# Patient Record
Sex: FEMALE | Race: WHITE | NOT HISPANIC OR LATINO | Employment: PART TIME | URBAN - METROPOLITAN AREA
[De-identification: names, ages, dates, MRNs, and addresses within clinical notes are randomized per-mention and may not be internally consistent; named-entity substitution may affect disease eponyms.]

---

## 2017-01-05 ENCOUNTER — ALLSCRIPTS OFFICE VISIT (OUTPATIENT)
Dept: OTHER | Facility: OTHER | Age: 42
End: 2017-01-05

## 2017-03-02 ENCOUNTER — ALLSCRIPTS OFFICE VISIT (OUTPATIENT)
Dept: OTHER | Facility: OTHER | Age: 42
End: 2017-03-02

## 2017-05-01 ENCOUNTER — ALLSCRIPTS OFFICE VISIT (OUTPATIENT)
Dept: OTHER | Facility: OTHER | Age: 42
End: 2017-05-01

## 2017-09-05 ENCOUNTER — ALLSCRIPTS OFFICE VISIT (OUTPATIENT)
Dept: OTHER | Facility: OTHER | Age: 42
End: 2017-09-05

## 2017-11-06 ENCOUNTER — ALLSCRIPTS OFFICE VISIT (OUTPATIENT)
Dept: OTHER | Facility: OTHER | Age: 42
End: 2017-11-06

## 2018-01-08 ENCOUNTER — ALLSCRIPTS OFFICE VISIT (OUTPATIENT)
Dept: OTHER | Facility: OTHER | Age: 43
End: 2018-01-08
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
## 2018-01-13 VITALS
HEIGHT: 67 IN | SYSTOLIC BLOOD PRESSURE: 139 MMHG | BODY MASS INDEX: 34.84 KG/M2 | DIASTOLIC BLOOD PRESSURE: 87 MMHG | WEIGHT: 222 LBS | HEART RATE: 74 BPM

## 2018-01-14 VITALS
BODY MASS INDEX: 34.92 KG/M2 | HEIGHT: 67 IN | WEIGHT: 222.5 LBS | DIASTOLIC BLOOD PRESSURE: 89 MMHG | SYSTOLIC BLOOD PRESSURE: 143 MMHG | HEART RATE: 82 BPM

## 2018-01-14 NOTE — PSYCH
Psych Med Mgmt    Appearance: was calm and cooperative, adequate hygiene and grooming and good eye contact  Observed mood: was dysphoric and anxious  Observed mood: affect was broad, but affect appropriate  Speech: a normal rate, speech soft and fluent  Thought processes: coherent/organized and normal thought processes  Hallucinations: no hallucinations present  Thought Content: no delusions  Abnormal Thoughts: The patient has no suicidal thoughts and no homicidal thoughts  Orientation: The patient is oriented to person, place and time  Recent and Remote Memory: short term memory intact and long term memory intact  Attention Span And Concentration: concentration intact  Insight: Insight intact  Judgment: Her judgment was intact  Muscle Strength And Tone  Normal gait and station  Language: no difficulty naming common objects, no difficulty repeating a phrase and no difficulty writing a sentence  Fund of knowledge: Patient displays adequate knowledge of current events, adequate fund of knowledge regarding past history and adequate fund of knowledge regarding vocabulary  The patient is experiencing moderate to severe pain  On a scale of 0 - 10 the pain severity is a 4  Treatment Recommendations: See plan  Risks, Benefits And Possible Side Effects Of Medications: Risks, benefits, and possible side effects of medications explained to patient and patient verbalizes understanding, Risks of medications explained if female patient  Patient verbalizes understanding and agrees to notify her doctor if she becomes pregnant  She reports normal appetite, normal energy level, no weight change and normal number of sleep hours       Keyon Noguera is a 40y/o white female with h/o bipolar disorder, here for medication review with primary c/o "Anxiety " She has daily anxiety, with o1-2x per month panic attacks in public places, triggered by crowds, but can go to work with the aid of Clonazepam  Panic Sxs are: severe anxiety, tremors, palpitations, SOB, chest tightness, dizziness, hyperventilating, sweats, and nausea  She became tearful at times when providing some past history, and states she has a lot of pressure and feels overwhelmed with her responsibilities  She is running a household, taking care of her family and attending college  However, she feels overall less depressed than in the past, and mood stable on present psychiatric regimen  She anticipates graduating with her second Assoc degree within the next year  She presently denies hopelessness, SI, HI, or manic or psychotic Sxs  She reports compliance to her psychiatric medications, feels they help, denies SE and does not want any changes  Pt f/u with psychotherapist Brynn Guidry biweekly x approx 9 months  She f/u with Dr Allie Serrato for Suboxone maintenance and denies any recent illicit drug use  The pain from fibromyalgia can affect her mood  Vitals  Signs [Data Includes: Current Encounter]   Recorded: [id number] 04:32PM   Heart Rate: 80  Systolic: 101, LUE  Diastolic: 86, LUE  Height: 5 ft 6 5 in  Weight: 209 lb 4 00 oz  BMI Calculated: 33 27  BSA Calculated: 2 05    Assessment    1  Atypical bipolar affective disorder (296 7) (F31 89)   2  Generalized anxiety disorder (300 02) (F41 1)   3  Panic attacks (300 01) (F41 0)    Plan    1  ClonazePAM 1 MG Oral Tablet; 1 tab PO TID prn anxiety   2  LamoTRIgine 150 MG Oral Tablet (LaMICtal); Take 2 tablets po daily      Discussed her Sxs and Tx options  Pt has Sxs of anxiety and panic disorder  Bipolar Sxs are under control  Pt feels all her Sxs are well managed by her existing regimen and does not want any changes  I recommended ongoing psychotherapy  Pt verbalized understanding and accepts the plan    Continue:  Lamotrigine 150mg (2) tabs po qd # 60 R1  Clonazepam 1mg (1) tab po tid prn anxiety E 30 R1  Eszopiclone 3mg (1) tabpo qhs prn insomnia--Pt not needing more at this time  Return 2 months Review of Systems    Constitutional: No fever, no chills, feels well, no tiredness, no recent weight gain or loss  Cardiovascular: no complaints of slow or fast heart rate, no chest pain, no palpitations  Respiratory: no complaints of shortness of breath, no wheezing, no dyspnea on exertion  Gastrointestinal: no complaints of abdominal pain, no constipation, no nausea, no diarrhea, no vomiting  Genitourinary: no complaints of dysuria, no incontinence, no pelvic pain, no urinary frequency  Musculoskeletal: as noted in HPI  Integumentary: no complaints of skin rash, no itching, no dry skin  Neurological: as noted in HPI  Past Psychiatric History    Past Psychiatric History: Pt was first diagnosed with a psychiatric illness (depression) at approx 13y/o, by a psychiatrist (Dr Jeffrey Coronel), when she attempted suicide by OD on antibiotics  She was not hospitalized at that time but was treated with Pamelor and Ativan  Pt f/u with Dr Jeffrey Coronel for many years then switched psychiatrists because she was not satisfied  Dr Jeffrey Coronel had diagnosed her with bipolar disorder for a manic episode which lasted 6 weeks, with Sxs of grandiosity, euphoric mood, rapid and pressured speech, racing thoughts, irritability, and impulsive behaviors  She was then seen by Dr Araceli Almazan until 18y/o and had to stop going because he was a child psychiatrist  She then did not follow up with anyone until her second hospitalization at 27y/o  She followed up with a few different psychiatrists (does not recall the names) after that and had been feeling better  She attributes her recovery to the ECT and did not have mood Sxs again until post partum from birth of second son 2007  She developed addiction to pain pills approx 2006 and her substance abuse specialist Dr Margo Durant referred her to psychiatry  Pt then came to [place] [address] and was initially seen by Dr Lisandra Celestin       Pt has had 3 total psychiatric hospitalizations  at age 15y/o at [facility] for depression, No suicide attempt  at age 25y/o at [facility] [place], [place]  for depression, No suicide attempt, had ECT which started inpatient and continued for a 6 month course  at approx age 33y/o [place], with suicide attempt by OD on pills  She believes post partum hormones exacerbated her Sxs  Substance Abuse Hx    Substance Abuse History: Pt drinks ETOH about 3-4x per month but denies abuse    She became addicted to pain pills and has been on maintenance Suboxone Tx x 6 years, first through her PMD Dr Anand David, then substance abuse specialist Dr Darci Sharp who then became ill  She then f/u with Dr Efrain Uribe for the past 1 1/2 years  Pt used to smoke THC but denies abuse  Active Problems    1  Atypical bipolar affective disorder (296 7) (F31 89)   2  Community acquired pneumonia (5) (J18 9)   3  Ear infection (382 9) (H66 90)   4  Pain in left foot (729 5) (M79 672)   5  Shortness of breath (786 05) (R06 02)    Past Medical History    1  History of Abstinence syndrome on maintenance opioid agonist therapy, no symptoms   (304 00) (F11 20)   2  History of Asthma (493 90) (J45 909)   3  History of Fibromyalgia, primary (729 1) (M79 7)   4  History of bipolar disorder (V11 1) (Z86 59)   5  History of hypertension (V12 59) (Z86 79)   6  History of Major depressive disorder, recurrent episode, severe with melancholic   features (413 56) (F33 2)   7  History of Nasal contusion (920) (S00 33XA)    The active problems and past medical history were reviewed and updated today  Allergies    1  Sulfa Drugs   2  Codeine Sulfate TABS    Current Meds   1  ClonazePAM 1 MG Oral Tablet; 1 tab PO TID prn anxiety; Therapy: 27Sgl7667 to (Evaluate:13Mar2016); Last Rx:49Fgx2611 Ordered   2  Eszopiclone 3 MG Oral Tablet; TAKE 1 TABLET AT BEDTIME AS NEEDED FOR SLEEP; Therapy: 07Hij2117 to (Evaluate:13Mar2016); Last Rx:20Fvo5151 Ordered   3  LamoTRIgine 150 MG Oral Tablet; Take 2 tablets po daily; Therapy: 11Aug2015 to (Evaluate:11Jul2016)  Requested for: 27HCN6814; Last   Rx:13Jan2016 Ordered   4  Omeprazole 20 MG Oral Capsule Delayed Release; Therapy: (Recorded:57Wyl3554) to Recorded   5  Promethazine-DM 6 25-15 MG/5ML Oral Syrup; TAKE 1 TEASPOONFUL EVERY 4 TO 6   HOURS AS NEEDED FOR COUGH; Therapy: 30QII1108 to (Evaluate:07Jul2013)  Requested for: 14WHI8616; Last   Rx:02Jul2013 Ordered   6  Pyridium TABS; Therapy: (Recorded:02Jul2013) to Recorded   7  Robaxin TABS; Therapy: (Recorded:13Jan2016) to Recorded   8  Ventolin  (90 Base) MCG/ACT Inhalation Aerosol Solution; INHALE 1 TO 2 PUFFS   EVERY 4 TO 6 HOURS AS NEEDED; Therapy: 37SVO7615 to (Last Rx:02Jul2013)  Requested for: 02Jul2013 Ordered    The medication list was reviewed and updated today  Family Psych History  Mother    1  Family history of Alcohol addiction    The family history was reviewed and updated today  Social History    · Current Every Day Smoker (305 1)   · Employed   · Home   · Number of children  The social history was reviewed and updated today  [place] [address] [employer] [facility] relationships are "Close"      Education   Completed up to 11th grade then Got her [address] [employer] Degree in [place] [address]    1  ClonazePAM 1 MG Oral Tablet; 1 tab PO TID prn anxiety; Therapy: 15Sep2015 to (Evaluate:11Sep2016); Last Rx:13Jul2016 Ordered   2  Eszopiclone 3 MG Oral Tablet; TAKE 1 TABLET AT BEDTIME AS NEEDED FOR SLEEP; Therapy: 11Aug2015 to (Evaluate:13Mar2016); Last Rx:15Sep2015 Ordered   3  LamoTRIgine 150 MG Oral Tablet (LaMICtal); Take 2 tablets po daily; Therapy: 11Aug2015 to (Evaluate:11Sep2016)  Requested for: 14AFR3455; Last   Rx:13Jul2016 Ordered    4  Promethazine-DM 6 25-15 MG/5ML Oral Syrup; TAKE 1 TEASPOONFUL EVERY 4 TO 6   HOURS AS NEEDED FOR COUGH;    Therapy: 63UTQ1196 to (Evaluate:49Yth8669)  Requested for: 29ICJ7245; Last   Rx:49Sfo0564 Ordered   5  Ventolin  (90 Base) MCG/ACT Inhalation Aerosol Solution; INHALE 1 TO 2 PUFFS   EVERY 4 TO 6 HOURS AS NEEDED; Therapy: 68QCX0880 to (Last Rx:57Avl1057)  Requested for: 86Pvb9139 Ordered    6  Omeprazole 20 MG Oral Capsule Delayed Release; Therapy: (Recorded:16Qvk7367) to Recorded   7  Pyridium TABS; Therapy: (Recorded:27Jyv6123) to Recorded   8  Robaxin TABS (Methocarbamol);    Therapy: (TYTTUHRJ:62YKH2339) to Recorded    Signatures   Electronically signed by : JAX Davalos; Jul 13 2016  4:51PM EST                       (Author)
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
## 2018-01-16 NOTE — PSYCH
Psych Med Mgmt    Appearance: restless and fidgety   angry, loud voice  Observed mood: euthymic, depressed, irritable, angry and cries intermittenly  Observed mood: affect appropriate   I don't why I cry so much still, but less than before  Speech: volume loud  Thought processes: coherent/organized  Hallucinations: no hallucinations present  Thought Content: no delusions  Abnormal Thoughts: The patient has no suicidal thoughts and no homicidal thoughts  Orientation: The patient is oriented to person, place and time, oriented to person, oriented to place and oriented to time  Recent and Remote Memory: short term memory intact and long term memory intact  Attention Span And Concentration: concentration intact  Insight: Insight intact  Judgment: Her judgment was intact  Her judgment was limited  Muscle Strength And Tone  Muscle strength and tone were normal  does have arthitis  Normal gait and station  Language: no difficulty naming common objects, no difficulty repeating a phrase and no difficulty writing a sentence  Fund of knowledge: Patient displays adequate knowledge of current events  The patient is experiencing no localized pain  On a scale of 0 - 10 the pain severity is a 3  Fibromyalgia, arthritis  Individual Psychotherapy 20 minutes provided today  Goals addressed in session:   She wants to be a  and is going to school  She works as a    She feels a lock of energy  Pt is very irritable She is trying to work on all of these issues     Treatment Recommendations: D/c prozac  Continue Klonopin  increase Lamictal to 300 mg daily  Continue Lunesta for sleep  pt see addiction physician and is on suboxone8/3mg film tid  Risks, Benefits And Possible Side Effects Of Medications: Risks, benefits, and possible side effects of medications explained to patient and patient verbalizes understanding     She reports normal appetite, decreased energy, no weight change and decrease in number of sleep hours   Pt angry crying , negative, irritable  No SI/HI or plan  No evidence of psychosis  Pt wants something to help her be less irritable and calm her down  She is in therapy but has cut down sessions over last month  Assessment    1  Atypical bipolar affective disorder (296 7) (F31 89)    Plan    1  FLUoxetine HCl - 20 MG Oral Capsule   2  LamoTRIgine 150 MG Oral Tablet (LaMICtal); Take 2 tablets po daily    History of Present Illness  see below       46 yr old woman with bipolar disrder 1 vs 2 with h/o multiple overdoseds and several inpt spych hospitalizationa and multiple drug trials  No trial of Tegretol and aborted trial with Lamictal  Pt is very labile and acutally just ran into large heavy glass door this am at work and now ahas a swollen nose and is crying  She has an icebag on  Past Psychiatric History    Past Psychiatric History: Pt took overdose of Amoxacillin at age 9, my name eve, I was fat  They made fun of her  At age 16, pt took a bottle of Atarax in suicide attmept  Mom is alcoholic and she brought a new  home and thet were having sex in front of me and I overdosed  I was a cutter as a teen until late 20's, to relieve the emotioanl pain  I overdosed at 15 after od, hosp at [facility] adolescent inpt psych unit, It was okay  Pamelor  did not work  a  On Ativan around 9 yrs old, Dr Vivi Barnes, On Xanax, ativan and Klonopin, it calms pt down  Used opiates in mid to late 20's, all prescribed by one doctor  Pt now on Suboxone for opiate dependence    pt stopped opiates in 2010  Then shooting heroin iv for 6 weeks  2010  Sometime during using heroin, pt feels she had her first manic episode  Then she started Suboxone with a Dr Kelly Valle and now Dr Camila Layton, addiction Md  She prescribes Suboxone   She doesn't want to prescive the benzos, pt [facility] po [facility], Prozac taking 20 prescibed 60 po daily, , LYrica 150 mg po at bedtime(it helps my back) , Lisiniopril, HCTz  Pt was on Lithium and gained 50 lbs  Lamictal -never really therapeutic dose  Depakote yes- gained weight  Zyprexa-weight gain  never on Tegretol  No real trial of Lamictal         Substance Abuse Hx    Substance Abuse History: 11 ETOH, 12-pot, 14=acid, 16-cocaine  I always liked pot  Drug free at 3 years  DJD, Interstitial cystitis, fibromyalgia   got pain meds for wisdon teeth= Percocet and then abuse  Septoplasty-deviated septum- opiates  Pt got addicted to opiates  Pt is now on Suboxone 8 mg po bid by Dr Nasra Trimble, to treat her opiate dependence  Active Problems    1  Atypical bipolar affective disorder (296 7) (F31 89)   2  Community acquired pneumonia (5) (J18 9)   3  Ear infection (382 9) (H66 90)   4  Pain in left foot (729 5) (M79 672)   5  Shortness of breath (786 05) (R06 02)    Past Medical History    1  History of Abstinence syndrome on maintenance opioid agonist therapy, no symptoms   (304 00) (F11 20)   2  History of Asthma (493 90) (J45 909)   3  History of Fibromyalgia, primary (729 1) (M79 7)   4  History of bipolar disorder (V11 1) (Z86 59)   5  History of hypertension (V12 59) (Z86 79)   6  History of Major depressive disorder, recurrent episode, severe with melancholic   features (146 45) (F33 2)   7  History of Nasal contusion (920) (S00 33XA)    The active problems and past medical history were reviewed and updated today  Surgical History    The surgical history was reviewed and updated today  Allergies    1  Codeine Sulfate TABS    Current Meds   1  ClonazePAM 1 MG Oral Tablet; 1 tab PO TID prn anxiety; Therapy: 15Sep2015 to (Evaluate:13Mar2016); Last Rx:15Sep2015 Ordered   2  Eszopiclone 3 MG Oral Tablet; TAKE 1 TABLET AT BEDTIME AS NEEDED FOR SLEEP; Therapy: 11Aug2015 to (Evaluate:13Mar2016); Last Rx:24Niw7975 Ordered   3   FLUoxetine HCl - 20 MG Oral Capsule; 1 capsule PO daily  Requested for: 42XEM1629; Last Rx:11Sep2015 Ordered   4  LamoTRIgine 100 MG Oral Tablet; TAKE 2 TABLETS DAILY AS DIRECTED; Therapy: 11Aug2015 to (Evaluate:13Mar2016)  Requested for: 15Sep2015; Last   Rx:15Sep2015 Ordered   5  Omeprazole 20 MG Oral Capsule Delayed Release; Therapy: (Recorded:83Wnx8996) to Recorded   6  Promethazine-DM 6 25-15 MG/5ML Oral Syrup; TAKE 1 TEASPOONFUL EVERY 4 TO 6   HOURS AS NEEDED FOR COUGH; Therapy: 42WOC6809 to (Evaluate:07Jul2013)  Requested for: 41VCX4251; Last   Rx:02Jul2013 Ordered   7  Pyridium TABS; Therapy: (Recorded:02Jul2013) to Recorded   8  Robaxin TABS; Therapy: (Recorded:13Jan2016) to Recorded   9  Ventolin  (90 Base) MCG/ACT Inhalation Aerosol Solution; INHALE 1 TO 2 PUFFS   EVERY 4 TO 6 HOURS AS NEEDED; Therapy: 71LWS1255 to (Last Rx:02Jul2013)  Requested for: 02Jul2013 Ordered    The medication list was reviewed and updated today  Family Psych History    1  Family history of Alcohol addiction    The family history was reviewed and updated today  Social History    · Current Every Day Smoker (305 1)  The social history was reviewed and updated today  Pt has a long h/o polysubstance abuse  She now sees a Dr Berenice Pacheco once a month and receives Subuxone 8mgpo bid  Pt lives with boyfriend who is an alcoholic and pt's children  Pt working in [employer] as a  in [employer]  She is daily smoker  No legal issued  No  experience  History Of Phys/Sex Abuse Or Perpetration    History Of Phys/Sex Abuse or Perpetration: Pt denies  End of Encounter Meds    1  ClonazePAM 1 MG Oral Tablet; 1 tab PO TID prn anxiety; Therapy: 15Sep2015 to (Evaluate:13Mar2016); Last Rx:15Sep2015 Ordered   2  Eszopiclone 3 MG Oral Tablet; TAKE 1 TABLET AT BEDTIME AS NEEDED FOR SLEEP; Therapy: 11Aug2015 to (Evaluate:13Mar2016); Last Rx:15Sep2015 Ordered   3  LamoTRIgine 150 MG Oral Tablet (LaMICtal); Take 2 tablets po daily;    Therapy: 11Aug2015 to (Evaluate:89Gsk4815)  Requested for: 90KUF5641; Last   Rx:13Jan2016 Ordered    4  Promethazine-DM 6 25-15 MG/5ML Oral Syrup; TAKE 1 TEASPOONFUL EVERY 4 TO 6   HOURS AS NEEDED FOR COUGH; Therapy: 12IXP9549 to (Evaluate:79Mdi3940)  Requested for: 75REH9344; Last   Rx:02Jul2013 Ordered   5  Ventolin  (90 Base) MCG/ACT Inhalation Aerosol Solution; INHALE 1 TO 2 PUFFS   EVERY 4 TO 6 HOURS AS NEEDED; Therapy: 75FDD0558 to (Last Rx:02Jul2013)  Requested for: 02Jul2013 Ordered    6  Omeprazole 20 MG Oral Capsule Delayed Release; Therapy: (Recorded:02Jul2013) to Recorded   7  Pyridium TABS; Therapy: (Recorded:02Jul2013) to Recorded   8  Robaxin TABS (Methocarbamol);    Therapy: (Recorded:13Jan2016) to Recorded    Signatures   Electronically signed by : MARCELO Garcia ; Jan 25 2016  6:22PM EST                       (Author)
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
## 2018-01-22 VITALS
HEIGHT: 67 IN | HEART RATE: 78 BPM | BODY MASS INDEX: 27.15 KG/M2 | SYSTOLIC BLOOD PRESSURE: 131 MMHG | WEIGHT: 173 LBS | DIASTOLIC BLOOD PRESSURE: 91 MMHG

## 2018-01-22 VITALS
SYSTOLIC BLOOD PRESSURE: 105 MMHG | DIASTOLIC BLOOD PRESSURE: 70 MMHG | HEART RATE: 71 BPM | BODY MASS INDEX: 30.45 KG/M2 | WEIGHT: 194 LBS | HEIGHT: 67 IN

## 2018-01-22 VITALS
SYSTOLIC BLOOD PRESSURE: 126 MMHG | HEART RATE: 73 BPM | HEIGHT: 67 IN | WEIGHT: 219 LBS | BODY MASS INDEX: 34.37 KG/M2 | DIASTOLIC BLOOD PRESSURE: 85 MMHG

## 2018-01-23 VITALS — HEIGHT: 67 IN | WEIGHT: 167 LBS | BODY MASS INDEX: 26.21 KG/M2
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
## 2018-02-08 ENCOUNTER — TELEPHONE (OUTPATIENT)
Dept: NEUROLOGY | Facility: CLINIC | Age: 43
End: 2018-02-08

## 2018-02-09 ENCOUNTER — TELEPHONE (OUTPATIENT)
Dept: NEUROLOGY | Facility: CLINIC | Age: 43
End: 2018-02-09

## 2018-02-13 ENCOUNTER — TELEPHONE (OUTPATIENT)
Dept: NEUROLOGY | Facility: CLINIC | Age: 43
End: 2018-02-13

## 2018-02-25 PROBLEM — G89.29 CHRONIC BACK PAIN: Status: ACTIVE | Noted: 2017-11-06

## 2018-02-25 PROBLEM — G47.01 INSOMNIA DUE TO MEDICAL CONDITION: Status: ACTIVE | Noted: 2017-05-01

## 2018-02-25 PROBLEM — M54.9 CHRONIC BACK PAIN: Status: ACTIVE | Noted: 2017-11-06

## 2019-01-08 ENCOUNTER — TRANSCRIBE ORDERS (OUTPATIENT)
Dept: RADIOLOGY | Facility: HOSPITAL | Age: 44
End: 2019-01-08

## 2019-01-08 ENCOUNTER — HOSPITAL ENCOUNTER (OUTPATIENT)
Dept: RADIOLOGY | Facility: HOSPITAL | Age: 44
Discharge: HOME | End: 2019-01-08
Payer: COMMERCIAL

## 2019-01-08 DIAGNOSIS — R52 PAIN: ICD-10-CM

## 2019-01-08 DIAGNOSIS — R52 PAIN: Primary | ICD-10-CM

## 2019-01-08 PROCEDURE — 73502 X-RAY EXAM HIP UNI 2-3 VIEWS: CPT

## 2019-08-20 ENCOUNTER — APPOINTMENT (OUTPATIENT)
Dept: LAB | Facility: HOSPITAL | Age: 44
End: 2019-08-20
Attending: PLASTIC SURGERY
Payer: COMMERCIAL

## 2019-08-20 ENCOUNTER — TRANSCRIBE ORDERS (OUTPATIENT)
Dept: ADMINISTRATIVE | Facility: HOSPITAL | Age: 44
End: 2019-08-20

## 2019-08-20 DIAGNOSIS — Z01.818 ENCOUNTER FOR PREADMISSION TESTING: ICD-10-CM

## 2019-08-20 DIAGNOSIS — Z01.818 ENCOUNTER FOR PREADMISSION TESTING: Primary | ICD-10-CM

## 2019-08-20 LAB
ANION GAP SERPL CALCULATED.3IONS-SCNC: 8 MMOL/L (ref 4–13)
BUN SERPL-MCNC: 16 MG/DL (ref 5–25)
CALCIUM SERPL-MCNC: 9.4 MG/DL (ref 8.3–10.1)
CHLORIDE SERPL-SCNC: 103 MMOL/L (ref 100–108)
CO2 SERPL-SCNC: 27 MMOL/L (ref 21–32)
CREAT SERPL-MCNC: 0.84 MG/DL (ref 0.6–1.3)
ERYTHROCYTE [DISTWIDTH] IN BLOOD BY AUTOMATED COUNT: 12.4 % (ref 11.6–15.1)
GFR SERPL CREATININE-BSD FRML MDRD: 86 ML/MIN/1.73SQ M
GLUCOSE SERPL-MCNC: 93 MG/DL (ref 65–140)
HCT VFR BLD AUTO: 43.2 % (ref 34.8–46.1)
HGB BLD-MCNC: 14 G/DL (ref 11.5–15.4)
MCH RBC QN AUTO: 30.1 PG (ref 26.8–34.3)
MCHC RBC AUTO-ENTMCNC: 32.4 G/DL (ref 31.4–37.4)
MCV RBC AUTO: 93 FL (ref 82–98)
PLATELET # BLD AUTO: 283 THOUSANDS/UL (ref 149–390)
PMV BLD AUTO: 9.4 FL (ref 8.9–12.7)
POTASSIUM SERPL-SCNC: 4.2 MMOL/L (ref 3.5–5.3)
RBC # BLD AUTO: 4.65 MILLION/UL (ref 3.81–5.12)
SODIUM SERPL-SCNC: 138 MMOL/L (ref 136–145)
WBC # BLD AUTO: 5.89 THOUSAND/UL (ref 4.31–10.16)

## 2019-08-20 PROCEDURE — 85027 COMPLETE CBC AUTOMATED: CPT

## 2019-08-20 PROCEDURE — 36415 COLL VENOUS BLD VENIPUNCTURE: CPT

## 2019-08-20 PROCEDURE — 80048 BASIC METABOLIC PNL TOTAL CA: CPT

## 2021-08-25 ENCOUNTER — TELEPHONE (OUTPATIENT)
Dept: PSYCHIATRY | Facility: CLINIC | Age: 46
End: 2021-08-25

## 2021-08-25 NOTE — TELEPHONE ENCOUNTER
Mother, Mis Travis (who sees Katie Lowery) called to ask if a psychiatry appointment could be set up for her Daniel Abdalla   Gave her the Intake line to get information and inquire about new patient appointment

## 2021-08-25 NOTE — TELEPHONE ENCOUNTER
Mom called to get help for daughter  She stated Ran Potts does not really want to see a Dr but she feels she needs it   I told her of the wait list  I also gave her the # of the [facility] program and am placing her on the list for a Dr

## 2023-11-03 ENCOUNTER — OFFICE VISIT (OUTPATIENT)
Dept: PSYCHIATRY | Facility: CLINIC | Age: 48
End: 2023-11-03
Payer: COMMERCIAL

## 2023-11-03 ENCOUNTER — OFFICE VISIT (OUTPATIENT)
Dept: PSYCHOLOGY | Facility: CLINIC | Age: 48
End: 2023-11-03
Payer: COMMERCIAL

## 2023-11-03 VITALS
HEIGHT: 67 IN | RESPIRATION RATE: 18 BRPM | DIASTOLIC BLOOD PRESSURE: 120 MMHG | HEART RATE: 120 BPM | WEIGHT: 203 LBS | BODY MASS INDEX: 31.86 KG/M2 | SYSTOLIC BLOOD PRESSURE: 176 MMHG

## 2023-11-03 DIAGNOSIS — F41.1 GENERALIZED ANXIETY DISORDER: ICD-10-CM

## 2023-11-03 DIAGNOSIS — F31.63 BIPOLAR DISORDER, CURR EPISODE MIXED, SEVERE, W/O PSYCHOTIC FEATURES (HCC): Primary | ICD-10-CM

## 2023-11-03 PROCEDURE — 90837 PSYTX W PT 60 MINUTES: CPT

## 2023-11-03 PROCEDURE — S0201 PARTIAL HOSPITALIZATION SERV: HCPCS

## 2023-11-03 PROCEDURE — 90792 PSYCH DIAG EVAL W/MED SRVCS: CPT | Performed by: PSYCHIATRY & NEUROLOGY

## 2023-11-03 PROCEDURE — G0176 OPPS/PHP;ACTIVITY THERAPY: HCPCS

## 2023-11-03 PROCEDURE — 90791 PSYCH DIAGNOSTIC EVALUATION: CPT

## 2023-11-03 PROCEDURE — G0410 GRP PSYCH PARTIAL HOSP 45-50: HCPCS

## 2023-11-03 RX ORDER — BUPRENORPHINE 2 MG/1
TABLET SUBLINGUAL
COMMUNITY
Start: 2023-08-17

## 2023-11-03 RX ORDER — FLUOXETINE HYDROCHLORIDE 20 MG/1
20 CAPSULE ORAL DAILY
COMMUNITY
Start: 2023-08-15

## 2023-11-03 RX ORDER — LAMOTRIGINE 25 MG/1
25 TABLET ORAL
Qty: 45 TABLET | Refills: 0 | Status: SHIPPED | OUTPATIENT
Start: 2023-11-03

## 2023-11-03 RX ORDER — ALBUTEROL SULFATE 0.63 MG/3ML
1 SOLUTION RESPIRATORY (INHALATION)
COMMUNITY

## 2023-11-03 RX ORDER — LISINOPRIL AND HYDROCHLOROTHIAZIDE 20; 12.5 MG/1; MG/1
1 TABLET ORAL DAILY
COMMUNITY

## 2023-11-03 RX ORDER — RIZATRIPTAN BENZOATE 10 MG/1
TABLET, ORALLY DISINTEGRATING ORAL
COMMUNITY
Start: 2023-08-15

## 2023-11-03 RX ORDER — ALBUTEROL SULFATE 90 UG/1
AEROSOL, METERED RESPIRATORY (INHALATION)
COMMUNITY

## 2023-11-03 RX ORDER — PHENAZOPYRIDINE HYDROCHLORIDE 200 MG/1
TABLET, FILM COATED ORAL
COMMUNITY

## 2023-11-03 RX ORDER — PROPRANOLOL HYDROCHLORIDE 80 MG/1
80 CAPSULE, EXTENDED RELEASE ORAL DAILY
COMMUNITY
Start: 2023-08-15

## 2023-11-03 NOTE — PSYCH
Subjective:     Patient ID: Kyra Sanchez 56 y.o. Female    [facility] Clinical Progress Notes      Specialized Services Documentation  Therapist must complete separate progress note for each specific clinical activity in which the individual participated during the day. GROUP PSYCHOTHERAPY - PIPPA     ([id number]) Certified Peer Specialist (CPS), Edilsonrom Del Cid shared her life story as she co-led this session. Kyra Sanchez  attentively listened to [employer]. This group encouraged power of learning about self, accepting illness and personal responsibility in recovery. Romeo Del Cid also shared the acronym she uses to remind her about what tools she can use on a daily basis to maintain mental health wellness. Kyra Sanchez was observed paying attention with active engagement and nodding along demonstrating interests. She was tearful throughout part of group. Community resources reviewed in addition to personal resources like the affirmations. PIPPA folder handed out to group members with local resources/support groups. beginning progress toward goals noted. Continue psychotherapy to encourage self -awareness and healthy engagement of supports. [place] Plan Objectives: 1.1, 1.2, 1.3, 1.4   Therapist: Lucien Short.  Liane Lefort
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
## 2023-11-03 NOTE — PSYCH
Subjective:    Patient ID: Kiran Rosen is a 56 y.o. female      [facility] Clinical Progress Notes      Specialized Services Documentation  Therapist must complete separate progress note for each specific clinical activity in which the individual participated during the day. Education Therapy   [id number]  B Lizeth Torres was excused due to intake during check in and goal review. [id number]  B Lizeth Torres engaged throughout the treatment day. Was engaged in learning related to Illness, Medication, Aftercare and Wellness Tools. Staff utilized Verbal, Written, A/V and Demonstration teaching methods. Kiran Rosen shared area of learning and set a goal for outside of program to not sleep all weekend. Tx Plan Objective: 1.1, 1.2, 1.4, Therapist: OMID Rivera [facility]    Group Psychotherapy  [id number] B Lizeth Torres participated actively in the wellness assessment, which evaluates progress on several different areas of wellness/wellbeing: physical, emotional, cognitive, vocational, social and spiritual. Clients rated their progress and discussed areas that need work. By completing and discussing areas of progress and challenges, members are connected and reminded that, in their mental health struggle, they are not alone. Topics of discussion revolved around positive experiences within each area of wellness as well as the challenging aspects to wellness within their past week. Kiran Rosen shared quite a bit about her workplace experiences following a peer mentioning fears about returning to work. Continue to note progress towards goals. Continue with psychotherapy to encourage the development and practice of reflecting on their mental health journey to alleviate symptoms and support wellness.   Tx Plan Objective 1.1, 1.2, 1.4 Therapist: FLAVIA Rivera
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
## 2023-11-03 NOTE — PSYCH
Subjective:     Patient ID: Marilu Miner is a 56 y.o. female. Case Management Note    Kassandra Cardenas LCSW    Current suicide risk : Low     5084-5802: Met with MORGAN Byrd via [employer]. Reviewed program, initial paperwork reviewed: Consent for Treatment, PHP handbook, HIPPA, General Consent, Client Bill of Rights, and Smoking/Drug and Alcohol Policy. Release of Information obtained for emergency contact - spouse Omar Angelucci) and PCP and Health Care Coordination Form. Marilu Miner has hard copies of all paperwork and verbally gave consent. Reviewed and given on call number. PCP notified of admission and health care coordination form sent. Completed initial psycho-social evaluation and initial treatment goals discussed. [id number]: Call made to [address] [place] [address] @ [phone] to start insurance authorization. ANA s/w Ivy to create case. Pending authorization #: [id number] CM transferred to [facility] Ori Shearer. @ [phone] EXT [phone]) to provide clinical - no answer. ANA left clinical information on secure VM as prompted and faxed supporting clinical to [phone] as prompted. Medications changes/added/denied? See Dr. Florinda Triana's    Treatment session number: 1    Individual Case Management Visit provided today? yes    [facility] follow up physician's orders: [facility] to [facility] - See admitting provider's documentation.

## 2023-11-03 NOTE — PSYCH
Visit Time    Visit Start Time: 0930  Visit Stop Time:  2424  Total Visit Duration: 0 minutes    Subjective:     Patient ID: Jaennie Clark is a 56 y.o. y.o. female. [facility] Clinical Progress Notes      Specialized Services Documentation  Therapist must complete separate progress note for each specific clinical activity in which the individual participated during the day. Allied Therapy    Excused due to evaluation.    Tx Plan Objective: na Therapist:  Leah RIVAS

## 2023-11-06 ENCOUNTER — OFFICE VISIT (OUTPATIENT)
Dept: PSYCHOLOGY | Facility: CLINIC | Age: 48
End: 2023-11-06
Payer: COMMERCIAL

## 2023-11-06 DIAGNOSIS — F31.63 BIPOLAR DISORDER, CURR EPISODE MIXED, SEVERE, W/O PSYCHOTIC FEATURES (HCC): Primary | ICD-10-CM

## 2023-11-06 DIAGNOSIS — F41.1 GENERALIZED ANXIETY DISORDER: ICD-10-CM

## 2023-11-06 PROCEDURE — G0410 GRP PSYCH PARTIAL HOSP 45-50: HCPCS

## 2023-11-06 PROCEDURE — S0201 PARTIAL HOSPITALIZATION SERV: HCPCS

## 2023-11-06 PROCEDURE — G0176 OPPS/PHP;ACTIVITY THERAPY: HCPCS

## 2023-11-06 PROCEDURE — G0177 OPPS/PHP; TRAIN & EDUC SERV: HCPCS

## 2023-11-06 NOTE — BH TREATMENT PLAN
Assessment/Plan:        Diagnoses and all orders for this visit:    Bipolar disorder, curr episode mixed, severe, w/o psychotic features ([address])    Generalized anxiety disorder      [facility] Treatment Plan     AREAS OF NEED: Motivation and self-care as evidenced by lack of engagement in ADLs and supports due to isolation and mood imbalance. Date Initiated: 11/06/23    Strengths: "I am creative and dependable."     LONG TERM GOAL:   Date Initiated: 11/3/23  1.0 - While at [address], I will gain support/insights/skills/techniques/education which I can use daily to decrease my symptoms and increase my quality of life. Target Date: 12/1/23  Completion Date:       SHORT TERM OBJECTIVES:     Date Initiated: 11/3/23  1.1 - I will attend group daily and participate in at least one group per day to build a natural support network and feel more socially connected to improve my moods. Revision Date:   Target Date: 11/14/23  Completion Date:     Date Initiated: 11/3/23  1.2 - I will identify and practice three new coping skills I can utilize daily outside of program in order to enhance my wellness. Revision Date:   Target Date: 11/14/23  Completion Date:    Date Initiated: 11/3/23  1.3 I will take medications as prescribed and share questions and concerns if arise. Revision Date:  Target Date: 12/14/23  Completion Date:     Date Initiated: 11/3/23  1.4 I will identify 3 ways my supports can assist in my wellness journey and use them if/when needed.     Revision Date:  Target Date: 11/14/23  Completion Date:        7 DAY REVISION:    Date Initiated:  Revision Date:   Target Date:   Completion Date:      PSYCHIATRY:  Date Initiated:  11/3/23  Medication Management and Education      Revision Date:       The person(s) responsible for carrying out the plan is Sally Wong MD and ANTOINETTE Ball    NURSING/SYMPTOM EDUCATION:  Date Initiated: 11/3/23      1.1, 1.2. 1.3, 1.4 Provide wellness/symptoms and skill education groups three to five days weekly to educate MORGAN Rudd on signs and symptoms of diagnoses, skills to manage stressors, and medication questions that will be addressed by the treatment team.        Revision date: The person(s) responsible for carrying out the plan is Josie Amato RN    PSYCHOLOGY:   Date Initiated: 11/3/23       1.1, 1.2, 1.4 Provide psychotherapy group 5 times per week to allow opportunity for MORGAN Rudd  to explore stressors and ways of coping. Revision Date:   The person(s) responsible for carrying out the plan is Corona Seth LCSW, Yashira Cali MS, YANET Almanza, and Mg Gan    ALLIED THERAPY:   Date Initiated: 11/3/23  1.1,1.2 Engage MORGAN Rudd in AT group 5 times daily to encourage development and use of wellness tools to decrease symptoms and promote recovery through meaningful activity. Revision Date:       The person(s) responsible for carrying out the plan is EVELYN Pepe    CASE MANAGEMENT:   Date Initiated: 11/3/23      1.0 This  will meet with MORGAN Rudd  2-3 times weekly to assess treatment progress, discharge planning, connection to community supports and UR as indicated. Revision Date:   The person(s) responsible for carrying out the plan is Myesha Clark    TREATMENT REVIEW/COMMENTS:     DISCHARGE CRITERIA: Identify 3 signs of progress and complete relapse prevention plan. DISCHARGE PLAN: Connect with identified outpatient providers. Estimated Length of Stay: 10 treatment days      CLIENT COMMENTS / Please share your thoughts, feelings, need and/or experiences regarding your treatment plan with Staff. Please see follow up note with comments. Signatures can be found on [facility] Treatment plan consent form.

## 2023-11-06 NOTE — PSYCH
Subjective:      Patient ID:+MORGAN Thompson 56 y.o. female     [facility] Clinical Progress Notes       Specialized Services Documentation  Therapist must complete separate progress note for each specific clinical activity in which the individual participated during the day. Education Therapy     [phone] attended the group on Part 2 of the Wellness Recovery Action Plan (WRAP). Group members were educated on the background of the WRAP. This writer explained the benefit of utilizing the WRAP prior to the members initiating it. Members then focused on developing the following portions of WRAP: Triggers and Early Warning Signs. This writer encouraged the members of group to continue utilizing the WRAP packet to develop plans inside and outside of program.         Anupam Orr displayed understanding through engagement in the topic with the group . For MORGAN Thompson,  GOOD progress toward goals was noted, through their engagement in group. Continue psychotherapy to encourage self-awareness and home practice of skills to support wellness.     Treatment Plan Objective 1.1, 1.2, 1.3, 1.4 Therapist: Rosalia Lyon RN

## 2023-11-06 NOTE — PSYCH
Subjective:    Patient ID: Mckay Rojas is a 56 y.o. female      [facility] Clinical Progress Notes      Specialized Services Documentation  Therapist must complete separate progress note for each specific clinical activity in which the individual participated during the day. Education Therapy   [id number]  MORGAN Magallanes actively shared in check in and goal review. Presented as receptive related to readiness to learn. Mckay Rojas  did not complete goal from last treatment day. did not present with any barriers to learning. [id number]  MORGAN Magallanes engaged throughout the treatment day. Was engaged in learning related to Illness, Medication, Aftercare and Wellness Tools. Staff utilized Verbal, Written, A/V and Demonstration teaching methods. Mckay Rojas shared area of learning and set a goal for outside of program to  her medications. Tx Plan Objective: 1.1, 1.2, 1.4, Therapist: [address]    Group Psychotherapy  [id number] MORGAN Magallanes actively participated in a psychotherapy group focused on control. Group members were each given a sticky note with a different item written on it, and asked to place it on the board, in or outside of a big [place]. The items were things like "my attitude," "the weather," "the past," "how others treat me," etc, and inside of the [place] represented having any amount of control over the item, and outside of the [place] represented having no control at all over the item. After the initial activity, a second [place] was added, which distinguished items we have some or indirect control over and items we have direct or total control over. Participants then re-organized the items on the board accordingly. The group was provided with individual handouts with the three labeled circles to create their own personal diagrams. Discussion was encouraged and prompted throughout the group, and members were asked to share any questions as they arose.  B Elmer Lied shared thoughtfully throughout and took notes too. Continue to note progress towards goals. Continue with psychotherapy to strengthen awareness of control.    Tx Plan Objective 1.1, 1.2, 1.4 Therapist: FLAVIA Ayala

## 2023-11-06 NOTE — PSYCH
Subjective:     Patient ID: Neda Harmon is a 56 y.o. female. [facility] Clinical Progress Notes      Specialized Services Documentation    Group Psychotherapy - Open Processing    [id number]: Neda Harmon actively participated in an open processing group on increasing empowerment and having an opportunity to be heard when one might feel isolated in sharing their experiences.  spent time engaging group participants with open ended questions, reflective questions, and encouragement from other group members. In addition, it is important for the group to be able to receive multiple perspectives and feedback from other group members in a safe environment.  provided space for members to share as they felt comfortable, active listening, encouragement, and offered support to group when warranted. This structure helped support the group in feeling cathartic, gain some interpersonal learning, group cohesiveness, altruism, and instilling hope. B Jhonatan Alonso engaged nonverbally as evidenced by taking notes, maintaining eye contact, open body language, and overall attentiveness. Moderate progress noted towards goal. Continue with open processing therapy to provide space to support individuals in building trust, gain corrective emotional experiences, and cultivate healthier inter-dependency with others. Tx Plan Objective 1.1, 1.2, 1.4 Therapist: Karuna Mercedes LCSW    Case Management Note    Kaylene Luis LCSW    Current suicide risk : Low     7321-2416: CM was approached by patient before morning assessment. Echo Hernandez reports that her family was friends with one of the providers here at [facility] and voiced concerns about confidentiality. CM reviewed that anything shared during time here at [facility] remains confidential. Echo Hernandez verbalized understanding of this however added that she's not sure how comfortable she is with this.  CM offered switch to virtual PHP since that is a different team of providers or offered to refer to another local PHP. Information provided, Fatmata Heller reports that she will consider options and let this CM know. Medications changes/added/denied? No    Treatment session number: 2    Individual Case Management Visit provided today?  Yes     [facility] follow up physician's orders: no new orders

## 2023-11-06 NOTE — PSYCH
VM received from Hilda Hackett with [employer] on Friday 11/3/23 @ 1638. Per Hilda Hackett 15 visits approved with [facility] on 11/3/23 with last covered date on 11/30. Confirmed auth #: [id number]. Kai Muñoz. @ [phone] EXT [phone].

## 2023-11-07 ENCOUNTER — APPOINTMENT (OUTPATIENT)
Dept: PSYCHOLOGY | Facility: CLINIC | Age: 48
End: 2023-11-07
Payer: COMMERCIAL

## 2023-11-07 ENCOUNTER — DOCUMENTATION (OUTPATIENT)
Dept: PSYCHOLOGY | Facility: CLINIC | Age: 48
End: 2023-11-07

## 2023-11-07 NOTE — PROGRESS NOTES
Case Management Note    Current suicide risk : Low     [id number]: CM s/w MORGAN Arely at request of . Shweta Masters reporting that she will not considering staying in [facility] if she has to mask. CM reviewed that expectation was set for all patients at [facility] to mask during program due to direction from management. Shweta Masters stating that due to her asthma she cannot mask. CM asked MORGAN Alejandra if she has considered stopping smoking, MORGAN Alejandra refused to answer and reports that she was going to [facility] anyway because of conflict of interest that she discussed with this CM yesterday. Shweta Masters refused to complete crisis plan with this CM, however was agreeable to accepting outpatient provider list for both psychiatry and therapy as obtained from [facility] website (prefix code: Oholman Vieranis). CM offered to switch MORGAN Canelajoe to virtual PHP with  [facility], MORGAN Alejandra  refusing to attending PHP virtually as she does not believe it would do any good for her. Shweta Masters provided with information for another local PHP to call and establish care/services. Shweta Masters denying any SI/HI/SIB and denying any further questions or concerns for this CM. Medications changes/added/denied? No    Treatment session number: 2    Individual Case Management Visit provided today? Yes     [facility] follow up physician's orders:  AMA discharge today

## 2023-11-07 NOTE — PROGRESS NOTES
Assessment/Plan:      Diagnoses and all orders for this visit:     Bipolar disorder, curr episode mixed, severe, w/o psychotic features ([address])     Generalized anxiety disorder        [facility] Treatment Plan      AREAS OF NEED: Motivation and self-care as evidenced by lack of engagement in ADLs and supports due to isolation and mood imbalance. Date Initiated: 11/06/23     Strengths: "I am creative and dependable."           LONG TERM GOAL:   Date Initiated: 11/3/23  1.0 - While at [address], I will gain support/insights/skills/techniques/education which I can use daily to decrease my symptoms and increase my quality of life. Target Date: 12/1/23  Completion Date: [facility] DISCHARGE 11/7/23        SHORT TERM OBJECTIVES:      Date Initiated: 11/3/23  1.1 - I will attend group daily and participate in at least one group per day to build a natural support network and feel more socially connected to improve my moods. Revision Date:   Target Date: 11/14/23  Completion Date: [facility]MA DISCHARGE 11/7/23     Date Initiated: 11/3/23  1.2 - I will identify and practice three new coping skills I can utilize daily outside of program in order to enhance my wellness. Revision Date:   Target Date: 11/14/23  Completion Date: AMA DISCHARGE 11/7/23     Date Initiated: 11/3/23  1.3 I will take medications as prescribed and share questions and concerns if arise. Revision Date:  Target Date: 12/14/23  Completion Date: AMA DISCHARGE 11/7/23     Date Initiated: 11/3/23  1.4 I will identify 3 ways my supports can assist in my wellness journey and use them if/when needed.     Revision Date:  Target Date: 11/14/23  Completion Date: [facility] DISCHARGE 11/7/23         7 DAY REVISION:     Date Initiated:  Revision Date:   Target Date:   Completion Date:        PSYCHIATRY:  Date Initiated:  11/3/23  Medication Management and Education      Revision Date:   Completion Date: AMA DISCHARGE 11/7/23      The person(s) responsible for carrying out the plan is Jake Fernandez MD and ANTOINETTE Mathias     NURSING/SYMPTOM EDUCATION:  Date Initiated: 11/3/23      1.1, 1.2. 1.3, 1.4 Provide wellness/symptoms and skill education groups three to five days weekly to educate Desean Connell on signs and symptoms of diagnoses, skills to manage stressors, and medication questions that will be addressed by the treatment team.        Revision date:  Completion Date: [facility] DISCHARGE 11/7/23       The person(s) responsible for carrying out the plan is Jessica Krishnan RN     PSYCHOLOGY:   Date Initiated: 11/3/23       1.1, 1.2, 1.4 Provide psychotherapy group 5 times per week to allow opportunity for MORGAN Delong  to explore stressors and ways of coping. Revision Date:   Completion Date: AMA DISCHARGE 11/7/23  The person(s) responsible for carrying out the plan is Liu Castellanos LCSW, Maria E Pascal MS, YANET Kumar, and [address]     ALLIED THERAPY:   Date Initiated: 11/3/23  1.1,1.2 Engage MORGAN Delong in AT group 5 times daily to encourage development and use of wellness tools to decrease symptoms and promote recovery through meaningful activity. Revision Date:   Completion Date: AMA DISCHARGE 11/7/23      The person(s) responsible for carrying out the plan is EVELYN Reeves     CASE MANAGEMENT:   Date Initiated: 11/3/23      1.0 This  will meet with MROGAN Delong  2-3 times weekly to assess treatment progress, discharge planning, connection to community supports and UR as indicated. Revision Date:   Completion Date: AMA DISCHARGE 11/7/23  The person(s) responsible for carrying out the plan is Myesha Horn     TREATMENT REVIEW/COMMENTS:      DISCHARGE CRITERIA: Identify 3 signs of progress and complete relapse prevention plan. DISCHARGE PLAN: Connect with identified outpatient providers.    Estimated Length of Stay: 10 treatment days       CLIENT COMMENTS / Please share your thoughts, feelings, need and/or experiences regarding your treatment plan with Staff. Please see follow up note with comments. Signatures can be found on [facility] Treatment plan consent form.

## 2023-11-07 NOTE — PSYCH
Visit Time    Visit Start Time: 0930  Visit Stop Time: 1986  Total Visit Duration: 60 minutes    Subjective:     Patient ID: Rah Nova is a 56 y.o. y.o. female. [facility] Clinical Progress Notes      Specialized Services Documentation  Therapist must complete separate progress note for each specific clinical activity in which the individual participated during the day. Allied Therapy  B Eddie Estrada actively shared in music therapy group focused on DBT skill wise mind. *** engaged in tasks exploring differences between reasonable and emotion mind and ways to get to wise mind. Group explored the benefits of mindfulness and practiced ways to slow down to begin to develop wise mind. *** Group reinforced role of “participating with wise mind” in order to prevent getting stuck in the past or fears of the future. *** effort toward treatment goal noted. Continue AT to encourage healthy skill development and practice of explored strategies.   Tx Plan Objective: 1.1Therapist:  Shannon RIVAS
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
## 2023-11-07 NOTE — PROGRESS NOTES
Call made to Huyen Hoffman. @ [phone] EXT [phone] regarding  Aimee Man #: Olivia Schmitz [id number]. Anders Vincent made aware of AMA discharge today and only used 2 of the 15 approved/visits on secure VM. Contact information for this CM left for any needed follow-up.

## 2023-11-07 NOTE — BH CRISIS PLAN
Client Name: Kiran Rosen       Client YOB: 1975  : 1975    Treatment Team (include name and contact information):     Psychotherapist:     Psychiatrist: ***   Release of information completed: {YES/NO:}    " ***   Release of information completed: {YES/NO:}    Other (Specify Role): ***    Release of information completed: {YES/NO:}    Other (Specify Role): ***   Release of information completed: {YES/NO:}    Healthcare Provider  Beatriz Martinez DO  [id number] [facility] [address] [id number]      Type of Plan   * Child plans (children 15 yo and younger) must be completed and signed by the child's legal guardian   * Plans for all individuals 15 yo and above must be signed by the client.      Plan Type: adolescent/adult (15 and over) Initial      My Personal Strengths are (in the client's own words):  "***"  The stressors and triggers that may put me at risk are:  {AMB PSYCH/BH Triggers/Stressors:20402}    Coping skills I can use to keep myself calm and safe:  {AMB PSYCH/BH COPING SKILLS:53588}    Coping skills/supports I can use to maintain abstinence from substance use:   {Substance Copin}    The people that provide me with help and support: (Include name, contact, and how they can help)   Support person #1: ***    * Phone number: {Support Person Phone:[phone]}    * How can they help me? ***   Support person #2:***    * Phone number: {Support Person Phone:62759}    * How can they help me? ***     Support person #3: ***    * Phone number: {Support Person Phone:53082}    * How can they help me? ***    In the past, the following has helped me in times of crisis:    {AMB PSYCH/BH Things that help:46929}      If it is an emergency and you need immediate help, call     If there is a possibility of danger to yourself or others, call the following crisis hotline resources:     Adult Crisis Numbers  Suicide Prevention Hotline - Dial 9-8-8  [facility]: [address]: [address]: [address]: [phone]  [facility]: [phone]  [facility]: [address]   [place]: [address]: [phone] (daytime). [phone] (after hours, weekends, holidays)     Child/Adolescent Crisis Numbers   [facility]: [address]: [phone]   [place]: [phone]   [facility]: [phone]    Please note: Some [facility] do not have a separate number for Child/Adolescent specific crisis. If your county is not listed under Child/Adolescent, please call the adult number for your county     National Talk to Text Line   All Ages - [phone]    In the event your feelings become unmanageable, and you cannot reach your support system, you will call 911 immediately or go to the nearest hospital emergency room.

## 2023-11-08 ENCOUNTER — APPOINTMENT (OUTPATIENT)
Dept: PSYCHOLOGY | Facility: CLINIC | Age: 48
End: 2023-11-08
Payer: COMMERCIAL

## 2023-11-09 ENCOUNTER — APPOINTMENT (OUTPATIENT)
Dept: PSYCHOLOGY | Facility: CLINIC | Age: 48
End: 2023-11-09
Payer: COMMERCIAL

## 2023-11-10 ENCOUNTER — APPOINTMENT (OUTPATIENT)
Dept: PSYCHOLOGY | Facility: CLINIC | Age: 48
End: 2023-11-10
Payer: COMMERCIAL

## 2023-11-13 ENCOUNTER — APPOINTMENT (OUTPATIENT)
Dept: PSYCHOLOGY | Facility: CLINIC | Age: 48
End: 2023-11-13
Payer: COMMERCIAL

## 2023-11-14 ENCOUNTER — APPOINTMENT (OUTPATIENT)
Dept: PSYCHOLOGY | Facility: CLINIC | Age: 48
End: 2023-11-14
Payer: COMMERCIAL

## 2023-11-15 ENCOUNTER — APPOINTMENT (OUTPATIENT)
Dept: PSYCHOLOGY | Facility: CLINIC | Age: 48
End: 2023-11-15
Payer: COMMERCIAL

## 2023-11-16 ENCOUNTER — APPOINTMENT (OUTPATIENT)
Dept: PSYCHOLOGY | Facility: CLINIC | Age: 48
End: 2023-11-16
Payer: COMMERCIAL

## 2023-11-17 ENCOUNTER — APPOINTMENT (OUTPATIENT)
Dept: PSYCHOLOGY | Facility: CLINIC | Age: 48
End: 2023-11-17
Payer: COMMERCIAL

## 2024-09-04 ENCOUNTER — HOSPITAL ENCOUNTER (EMERGENCY)
Facility: HOSPITAL | Age: 49
Discharge: HOME | End: 2024-09-04
Attending: EMERGENCY MEDICINE
Payer: COMMERCIAL

## 2024-09-04 ENCOUNTER — APPOINTMENT (EMERGENCY)
Dept: RADIOLOGY | Facility: HOSPITAL | Age: 49
End: 2024-09-04
Payer: COMMERCIAL

## 2024-09-04 VITALS
SYSTOLIC BLOOD PRESSURE: 169 MMHG | OXYGEN SATURATION: 100 % | RESPIRATION RATE: 20 BRPM | DIASTOLIC BLOOD PRESSURE: 98 MMHG | TEMPERATURE: 97.9 F | HEART RATE: 78 BPM

## 2024-09-04 DIAGNOSIS — F41.9 ANXIETY: ICD-10-CM

## 2024-09-04 DIAGNOSIS — R07.89 ATYPICAL CHEST PAIN: Primary | ICD-10-CM

## 2024-09-04 LAB
2HR DELTA HS TROPONIN: -1 NG/L
ALBUMIN SERPL BCG-MCNC: 4.3 G/DL (ref 3.5–5)
ALP SERPL-CCNC: 130 U/L (ref 34–104)
ALT SERPL W P-5'-P-CCNC: 86 U/L (ref 7–52)
ANION GAP SERPL CALCULATED.3IONS-SCNC: 7 MMOL/L (ref 4–13)
AST SERPL W P-5'-P-CCNC: 30 U/L (ref 13–39)
ATRIAL RATE: 134 BPM
BASOPHILS # BLD AUTO: 0.03 THOUSANDS/ÂΜL (ref 0–0.1)
BASOPHILS NFR BLD AUTO: 0 % (ref 0–1)
BILIRUB SERPL-MCNC: 0.28 MG/DL (ref 0.2–1)
BUN SERPL-MCNC: 16 MG/DL (ref 5–25)
CALCIUM SERPL-MCNC: 9.5 MG/DL (ref 8.4–10.2)
CARDIAC TROPONIN I PNL SERPL HS: 4 NG/L
CARDIAC TROPONIN I PNL SERPL HS: 5 NG/L
CHLORIDE SERPL-SCNC: 102 MMOL/L (ref 96–108)
CO2 SERPL-SCNC: 29 MMOL/L (ref 21–32)
CREAT SERPL-MCNC: 1.01 MG/DL (ref 0.6–1.3)
D DIMER PPP FEU-MCNC: 0.32 UG/ML FEU
EOSINOPHIL # BLD AUTO: 0.23 THOUSAND/ÂΜL (ref 0–0.61)
EOSINOPHIL NFR BLD AUTO: 3 % (ref 0–6)
ERYTHROCYTE [DISTWIDTH] IN BLOOD BY AUTOMATED COUNT: 12.7 % (ref 11.6–15.1)
GFR SERPL CREATININE-BSD FRML MDRD: 66 ML/MIN/1.73SQ M
GLUCOSE SERPL-MCNC: 93 MG/DL (ref 65–140)
HCT VFR BLD AUTO: 46 % (ref 34.8–46.1)
HGB BLD-MCNC: 15 G/DL (ref 11.5–15.4)
IMM GRANULOCYTES # BLD AUTO: 0.02 THOUSAND/UL (ref 0–0.2)
IMM GRANULOCYTES NFR BLD AUTO: 0 % (ref 0–2)
LYMPHOCYTES # BLD AUTO: 3.43 THOUSANDS/ÂΜL (ref 0.6–4.47)
LYMPHOCYTES NFR BLD AUTO: 40 % (ref 14–44)
MCH RBC QN AUTO: 29.4 PG (ref 26.8–34.3)
MCHC RBC AUTO-ENTMCNC: 32.6 G/DL (ref 31.4–37.4)
MCV RBC AUTO: 90 FL (ref 82–98)
MONOCYTES # BLD AUTO: 0.56 THOUSAND/ÂΜL (ref 0.17–1.22)
MONOCYTES NFR BLD AUTO: 7 % (ref 4–12)
NEUTROPHILS # BLD AUTO: 4.39 THOUSANDS/ÂΜL (ref 1.85–7.62)
NEUTS SEG NFR BLD AUTO: 50 % (ref 43–75)
NRBC BLD AUTO-RTO: 0 /100 WBCS
P AXIS: 60 DEGREES
PLATELET # BLD AUTO: 288 THOUSANDS/UL (ref 149–390)
PMV BLD AUTO: 9.3 FL (ref 8.9–12.7)
POTASSIUM SERPL-SCNC: 3.3 MMOL/L (ref 3.5–5.3)
PR INTERVAL: 132 MS
PROT SERPL-MCNC: 6.9 G/DL (ref 6.4–8.4)
QRS AXIS: -34 DEGREES
QRSD INTERVAL: 86 MS
QT INTERVAL: 302 MS
QTC INTERVAL: 450 MS
RBC # BLD AUTO: 5.1 MILLION/UL (ref 3.81–5.12)
SODIUM SERPL-SCNC: 138 MMOL/L (ref 135–147)
T WAVE AXIS: 55 DEGREES
VENTRICULAR RATE: 134 BPM
WBC # BLD AUTO: 8.66 THOUSAND/UL (ref 4.31–10.16)

## 2024-09-04 PROCEDURE — 85025 COMPLETE CBC W/AUTO DIFF WBC: CPT | Performed by: EMERGENCY MEDICINE

## 2024-09-04 PROCEDURE — 99285 EMERGENCY DEPT VISIT HI MDM: CPT | Performed by: EMERGENCY MEDICINE

## 2024-09-04 PROCEDURE — 36415 COLL VENOUS BLD VENIPUNCTURE: CPT

## 2024-09-04 PROCEDURE — 80053 COMPREHEN METABOLIC PANEL: CPT | Performed by: EMERGENCY MEDICINE

## 2024-09-04 PROCEDURE — 85379 FIBRIN DEGRADATION QUANT: CPT

## 2024-09-04 PROCEDURE — 93010 ELECTROCARDIOGRAM REPORT: CPT | Performed by: INTERNAL MEDICINE

## 2024-09-04 PROCEDURE — 96374 THER/PROPH/DIAG INJ IV PUSH: CPT

## 2024-09-04 PROCEDURE — 99285 EMERGENCY DEPT VISIT HI MDM: CPT

## 2024-09-04 PROCEDURE — 93005 ELECTROCARDIOGRAM TRACING: CPT

## 2024-09-04 PROCEDURE — 84484 ASSAY OF TROPONIN QUANT: CPT | Performed by: EMERGENCY MEDICINE

## 2024-09-04 PROCEDURE — 71046 X-RAY EXAM CHEST 2 VIEWS: CPT

## 2024-09-04 RX ORDER — LORAZEPAM 2 MG/ML
0.5 INJECTION INTRAMUSCULAR ONCE
Status: COMPLETED | OUTPATIENT
Start: 2024-09-04 | End: 2024-09-04

## 2024-09-04 RX ORDER — HYDROXYZINE HYDROCHLORIDE 25 MG/1
25 TABLET, FILM COATED ORAL EVERY 6 HOURS PRN
Qty: 12 TABLET | Refills: 0 | Status: SHIPPED | OUTPATIENT
Start: 2024-09-04

## 2024-09-04 RX ADMIN — LORAZEPAM 0.5 MG: 2 INJECTION INTRAMUSCULAR; INTRAVENOUS at 18:25

## 2024-09-04 NOTE — DISCHARGE INSTRUCTIONS
You were evaluated in the emergency department for chest pain.  Your heart enzyme, troponin, was normal.  Return to the emergency department if your chest pain acutely changes or worsens, or if you develop new shortness of breath, vomiting, dizziness, lightheadedness, or just generally feel worse.  It is important that you follow-up with your primary care physician in 1 week

## 2024-09-04 NOTE — ED PROVIDER NOTES
"History  Chief Complaint   Patient presents with    Chest Pain     As of about 2pm today. Pt states that she initially thought is was heart burn and took 2 Protonix but it has been consistent and feels crushing.     48-year-old female with past medical history hypertension, noncompliance with medications, current smoker, presents to ED for evaluation of right-sided chest pain beginning today.  Patient states that approximately 2 hours prior to arrival, she started to experience right-sided chest pain.  It is described as constant, right-sided, \"crushing and burning \"chest pain that radiates to the back.  It has only improved slightly since it began.  It is worsened with movement.  Patient reports she has a family history of cardiac problems.  She denies a personal history of blood clots, recent travel.  Patient has a history of hypertension, for which she is nonadherent to her medications.  Patient reports she is going through a time of stress in her life.        Prior to Admission Medications   Prescriptions Last Dose Informant Patient Reported? Taking?   FLUoxetine (PROzac) 20 mg capsule   Yes No   Sig: Take 20 mg by mouth daily   albuterol (ACCUNEB) 0.63 MG/3ML nebulizer solution   Yes No   Sig: Inhale 1 ampule   albuterol (PROVENTIL HFA,VENTOLIN HFA) 90 mcg/act inhaler   Yes No   Sig: INHALE 2 PUFFS EVERY FOUR HOURS AS NEEDED   buprenorphine (SUBUTEX) 2 mg   Yes No   Sig: TWO TABLETS SUBLINGUAL DAILY   lamoTRIgine (LaMICtal) 25 mg tablet   No No   Sig: Take 1 tablet (25 mg total) by mouth daily at bedtime Take 1 tablet po hs for 2 weeks and them increased to 50 mg po hs   lisinopril-hydrochlorothiazide (PRINZIDE,ZESTORETIC) 20-12.5 MG per tablet   Yes No   Sig: Take 1 tablet by mouth daily   phenazopyridine (Pyridium) 200 mg tablet   Yes No   Sig: Take by mouth   propranolol (INDERAL LA) 80 mg 24 hr capsule   Yes No   Sig: Take 80 mg by mouth daily   rizatriptan (MAXALT-MLT) 10 mg disintegrating tablet   Yes No "   Sig: DISSOLVE ON TONGUE AS NEEDED MIGRAINE. REPEAT X1 PER 24 HOURS.      Facility-Administered Medications: None       Past Medical History:   Diagnosis Date    Asthma     Chronic back pain     Community acquired pneumonia     Fibromyalgia     History of electroconvulsive therapy     Nasal contusion        Past Surgical History:   Procedure Laterality Date    CHOLECYSTECTOMY      HYSTERECTOMY      NISSEN FUNDOPLICATION         Family History   Problem Relation Age of Onset    Alcohol abuse Mother     Psychiatric Illness Brother     Schizophrenia Maternal Uncle     Completed Suicide  Neg Hx      I have reviewed and agree with the history as documented.    E-Cigarette/Vaping    E-Cigarette Use Current Every Day User      E-Cigarette/Vaping Substances    Nicotine Yes      Social History     Tobacco Use    Smoking status: Every Day     Current packs/day: 0.25     Types: Cigarettes    Smokeless tobacco: Never   Vaping Use    Vaping status: Every Day    Substances: Nicotine   Substance Use Topics    Alcohol use: Not Currently     Comment: Pt drinks ETOH about 3-4x per month but denies abuse    Drug use: Yes     Types: Marijuana     Comment: See narrative        Review of Systems   Constitutional:  Negative for chills and fever.   HENT:  Negative for congestion.    Respiratory:  Positive for shortness of breath. Negative for cough.    Cardiovascular:  Positive for chest pain. Negative for leg swelling.   Gastrointestinal:  Positive for nausea. Negative for abdominal pain and vomiting.   Genitourinary:  Negative for difficulty urinating.   Neurological:  Negative for dizziness, weakness, light-headedness, numbness and headaches.   Psychiatric/Behavioral:  Negative for behavioral problems and confusion.        Physical Exam  ED Triage Vitals [09/04/24 1624]   Temperature Pulse Respirations Blood Pressure SpO2   97.9 °F (36.6 °C) (!) 132 20 (!) 161/116 100 %      Temp Source Heart Rate Source Patient Position - Orthostatic  VS BP Location FiO2 (%)   Temporal Monitor Sitting Left arm --      Pain Score       7             Orthostatic Vital Signs  Vitals:    09/04/24 1624 09/04/24 1812 09/04/24 1815   BP: (!) 161/116  169/98   Pulse: (!) 132 78    Patient Position - Orthostatic VS: Sitting         Physical Exam  Vitals and nursing note reviewed.   Constitutional:       General: She is not in acute distress.     Appearance: She is well-developed and normal weight. She is not ill-appearing, toxic-appearing or diaphoretic.   HENT:      Head: Normocephalic and atraumatic.   Eyes:      Extraocular Movements: Extraocular movements intact.   Cardiovascular:      Rate and Rhythm: Regular rhythm. Tachycardia present.      Pulses:           Radial pulses are 2+ on the right side and 2+ on the left side.      Heart sounds: Normal heart sounds.   Pulmonary:      Effort: Pulmonary effort is normal.   Abdominal:      Palpations: Abdomen is soft.      Tenderness: There is no abdominal tenderness.   Musculoskeletal:      Cervical back: Neck supple.      Right lower leg: No tenderness. No edema.      Left lower leg: No tenderness. No edema.   Skin:     General: Skin is warm and dry.      Capillary Refill: Capillary refill takes less than 2 seconds.   Neurological:      General: No focal deficit present.      Mental Status: She is alert and oriented to person, place, and time.         ED Medications  Medications   LORazepam (ATIVAN) injection 0.5 mg (0.5 mg Intravenous Given 9/4/24 1825)       Diagnostic Studies  Results Reviewed       Procedure Component Value Units Date/Time    HS Troponin I 2hr [[id number]]  (Normal) Collected: 09/04/24 1905    Lab Status: Final result Specimen: Blood from Arm, Right Updated: 09/04/24 1943     hs TnI 2hr 4 ng/L      Delta 2hr hsTnI -1 ng/L     HS Troponin I 4hr [[id number]]     Lab Status: No result Specimen: Blood     D-dimer, quantitative [[id number]]  (Normal) Collected: 09/04/24 1807    Lab Status: Final result  Specimen: Blood from Arm, Right Updated: 09/04/24 1839     D-Dimer, Quant 0.32 ug/ml FEU     HS Troponin 0hr (reflex protocol) [[id number]]  (Normal) Collected: 09/04/24 1652    Lab Status: Final result Specimen: Blood from Arm, Left Updated: 09/04/24 1743     hs TnI 0hr 5 ng/L     Comprehensive metabolic panel [[id number]]  (Abnormal) Collected: 09/04/24 1652    Lab Status: Final result Specimen: Blood from Arm, Left Updated: 09/04/24 1725     Sodium 138 mmol/L      Potassium 3.3 mmol/L      Chloride 102 mmol/L      CO2 29 mmol/L      ANION GAP 7 mmol/L      BUN 16 mg/dL      Creatinine 1.01 mg/dL      Glucose 93 mg/dL      Calcium 9.5 mg/dL      AST 30 U/L      ALT 86 U/L      Alkaline Phosphatase 130 U/L      Total Protein 6.9 g/dL      Albumin 4.3 g/dL      Total Bilirubin 0.28 mg/dL      eGFR 66 ml/min/1.73sq m     Narrative:      [employer] Kid[employer] guidelines for Chronic Kidney Disease (CKD):     Stage 1 with normal or high GFR (GFR > 90 mL/min/1.73 square meters)    Stage 2 Mild CKD (GFR = 60-89 mL/min/1.73 square meters)    Stage 3A Moderate CKD (GFR = 45-59 mL/min/1.73 square meters)    Stage 3B Moderate CKD (GFR = 30-44 mL/min/1.73 square meters)    Stage 4 Severe CKD (GFR = 15-29 mL/min/1.73 square meters)    Stage 5 End Stage CKD (GFR <15 mL/min/1.73 square meters)  Note: GFR calculation is accurate only with a steady state creatinine    CBC and differential [[id number]] Collected: 09/04/24 1652    Lab Status: Final result Specimen: Blood from Arm, Left Updated: 09/04/24 1706     WBC 8.66 Thousand/uL      RBC 5.10 Million/uL      Hemoglobin 15.0 g/dL      Hematocrit 46.0 %      MCV 90 fL      MCH 29.4 pg      MCHC 32.6 g/dL      RDW 12.7 %      MPV 9.3 fL      Platelets 288 Thousands/uL      nRBC 0 /100 WBCs      Segmented % 50 %      Immature Grans % 0 %      Lymphocytes % 40 %      Monocytes % 7 %      Eosinophils Relative 3 %      Basophils Relative 0 %      Absolute Neutrophils 4.39  Thousands/µL      Absolute Immature Grans 0.02 Thousand/uL      Absolute Lymphocytes 3.43 Thousands/µL      Absolute Monocytes 0.56 Thousand/µL      Eosinophils Absolute 0.23 Thousand/µL      Basophils Absolute 0.03 Thousands/µL                    XR chest 2 views    (Results Pending)         Procedures  Procedures      ED Course  ED Course as of 09/04/24 2011   Wed Sep 04, 2024   1745 hs TnI 0hr: 5   1858 D-Dimer, Quant: 0.32   1949 hs TnI 2hr: 4  Will discharge to home.  I instructed the patient to follow-up with her primary care physician.  She states she will find a new one and do this.  Patient stable at time of discharge             HEART Risk Score      Flowsheet Row Most Recent Value   Heart Score Risk Calculator    History 1 Filed at: 09/04/2024 1746   ECG 1 Filed at: 09/04/2024 1746   Age 1 Filed at: 09/04/2024 1746   Risk Factors 2 Filed at: 09/04/2024 1746   Troponin 0 Filed at: 09/04/2024 1746   HEART Score 5 Filed at: 09/04/2024 1746                PERC Rule for PE      Flowsheet Row Most Recent Value   PERC Rule for PE    Age >=50 0 Filed at: 09/04/2024 1737   HR >=100 1 Filed at: 09/04/2024 1737   O2 Sat on room air < 95% 0 Filed at: 09/04/2024 1737   History of PE or DVT 0 Filed at: 09/04/2024 1737   Recent trauma or surgery --   Hemoptysis 0 Filed at: 09/04/2024 1737   Exogenous estrogen 0 Filed at: 09/04/2024 1737   Unilateral leg swelling 0 Filed at: 09/04/2024 1737   PERC Rule for PE Results 1 Filed at: 09/04/2024 1737                SBIRT 20yo+      Flowsheet Row Most Recent Value   Initial Alcohol Screen: US AUDIT-C     1. How often do you have a drink containing alcohol? 0 Filed at: 09/04/2024 1628   2. How many drinks containing alcohol do you have on a typical day you are drinking?  0 Filed at: 09/04/2024 1628   3a. Male UNDER 65: How often do you have five or more drinks on one occasion? 0 Filed at: 09/04/2024 1628   3b. FEMALE Any Age, or MALE 65+: How often do you have 4 or more drinks  on one occassion? 0 Filed at: 09/04/2024 1628   Audit-C Score 0 Filed at: 09/04/2024 1628   [place]: How many times in the past year have you...    Used an illegal drug or used a prescription medication for non-medical reasons? Never Filed at: 09/04/2024 1628            Wells' Criteria for PE      Flowsheet Row Most Recent Value   Wells' Criteria for PE    Clinical signs and symptoms of DVT 0 Filed at: 09/04/2024 1737   PE is primary diagnosis or equally likely 0 Filed at: 09/04/2024 1737   HR >100 1.5 Filed at: 09/04/2024 1737   Immobilization at least 3 days or Surgery in the previous 4 weeks 0 Filed at: 09/04/2024 1737   Previous, objectively diagnosed PE or DVT 0 Filed at: 09/04/2024 1737   Hemoptysis 0 Filed at: 09/04/2024 1737   Malignancy with treatment within 6 months or palliative 0 Filed at: 09/04/2024 1737   Wells' Criteria Total 1.5 Filed at: 09/04/2024 1737              Medical Decision Making  48-year-old female with past medical history hypertension, noncompliance, presents ED for evaluation of right-sided chest pain beginning today.  On exam, patient is hypertensive, tachycardic.  Saturating well on room air with no increased respiratory effort.  Heart tachycardic, regular rhythm.  Lungs clear to auscultation bilaterally.  Differential diagnosis: Rule out ACS.  Patient has a risk factor of smoking, does not PERC out for PE.  I doubt infectious etiology, pneumothorax.  Plan: EKG, chest x-ray, CBC, BMP, troponin, D-dimer.  If above workup negative, will discharge to home    Amount and/or Complexity of Data Reviewed  Labs: ordered. Decision-making details documented in ED Course.  Radiology: ordered.    Risk  Prescription drug management.          Disposition  Final diagnoses:   Atypical chest pain   Anxiety     Time reflects when diagnosis was documented in both MDM as applicable and the Disposition within this note       Time User Action Codes Description Comment    9/4/2024  7:50 PM Juan Antonio Bright  [R07.89] Atypical chest pain     9/4/2024  7:54 PM Juan Antonio Bright [F41.9] Anxiety           ED Disposition       ED Disposition   Discharge    Condition   Stable    Date/Time   Wed Sep 4, 2024 1950    Comment   B Arely Burrows discharge to home/self care.                   Follow-up Information       Follow up With Specialties Details Why Contact Info    Parker Gomez DO Internal Medicine Schedule an appointment as soon as possible for a visit in 1 week  [address]  [address]  [place] [id number]  [phone]              Discharge Medication List as of 9/4/2024  7:55 PM        START taking these medications    Details   hydrOXYzine HCL (ATARAX) 25 mg tablet Take 1 tablet (25 mg total) by mouth every 6 (six) hours as needed for anxiety, Starting Wed 9/4/2024, Normal           CONTINUE these medications which have NOT CHANGED    Details   albuterol (ACCUNEB) 0.63 MG/3ML nebulizer solution Inhale 1 ampule, Historical Med      albuterol (PROVENTIL HFA,VENTOLIN HFA) 90 mcg/act inhaler INHALE 2 PUFFS EVERY FOUR HOURS AS NEEDED, Historical Med      buprenorphine (SUBUTEX) 2 mg TWO TABLETS SUBLINGUAL DAILY, Historical Med      FLUoxetine (PROzac) 20 mg capsule Take 20 mg by mouth daily, Starting Tue 8/15/2023, Historical Med      lamoTRIgine (LaMICtal) 25 mg tablet Take 1 tablet (25 mg total) by mouth daily at bedtime Take 1 tablet po hs for 2 weeks and them increased to 50 mg po hs, Starting Fri 11/3/2023, Normal      lisinopril-hydrochlorothiazide (PRINZIDE,ZESTORETIC) 20-12.5 MG per tablet Take 1 tablet by mouth daily, Historical Med      phenazopyridine (Pyridium) 200 mg tablet Take by mouth, Historical Med      propranolol (INDERAL LA) 80 mg 24 hr capsule Take 80 mg by mouth daily, Starting Tue 8/15/2023, Historical Med      rizatriptan (MAXALT-MLT) 10 mg disintegrating tablet DISSOLVE ON TONGUE AS NEEDED MIGRAINE. REPEAT X1 PER 24 HOURS., Historical Med           No discharge procedures on  file.    PDMP Review       None             ED Provider  Attending physically available and evaluated MORGAN Burrows. I managed the patient along with the ED Attending.    Electronically Signed by           Juan Antonio Bright DO  09/04/24 2011

## 2024-09-04 NOTE — ED ATTENDING ATTESTATION
I, Rogelio Melendez MD, saw and evaluated the patient. I have discussed the patient with the resident and agree with the resident's findings, Plan of Care, and MDM as documented in the resident's note, except where noted. All available labs and Radiology studies were reviewed.  I was present for key portions of any procedure(s) performed by the resident and I was immediately available to provide assistance.    At this point I agree with the current assessment done in the Emergency Department.  I have conducted an independent evaluation of this patient a history and physical is as follows:    47 yo female with a history of bipolar disorder, chronic back pain, fibromyalgia, and anxiety presents to the ED complaining of chest pain since around 1400 today.    ROS: per resident physician note    Gen: NAD, AA&Ox3  HEENT: PERRL, EOMI  Neck: supple  CV: RRR  Lungs: CTA B/L  Abdomen: soft, NT/ND  Ext: no swelling or deformity  Neuro: 5/5 strength all extremities, sensation grossly intact  Skin: no rash    ED Course        Critical Care Time  Procedures